# Patient Record
Sex: FEMALE | Race: WHITE
[De-identification: names, ages, dates, MRNs, and addresses within clinical notes are randomized per-mention and may not be internally consistent; named-entity substitution may affect disease eponyms.]

---

## 2017-02-07 ENCOUNTER — HOSPITAL ENCOUNTER (OUTPATIENT)
Dept: HOSPITAL 45 - C.MAMM | Age: 62
Discharge: HOME | End: 2017-02-07
Attending: OBSTETRICS & GYNECOLOGY
Payer: COMMERCIAL

## 2017-02-07 DIAGNOSIS — Z12.31: Primary | ICD-10-CM

## 2017-02-07 NOTE — MAMMOGRAPHY REPORT
BILATERAL DIGITAL SCREENING MAMMOGRAM WITH CAD: 2/7/2017

CLINICAL HISTORY: Routine screening.  Patient has no complaints.  





TECHNIQUE: Bilateral CC and MLO views were obtained.  Current study was also evaluated with a Comput
er Aided Detection (CAD) system.  



COMPARISON: Comparison is made to exams dated:  2/4/2016 mammogram, 1/29/2014 mammogram, 2/3/2015 ma
mmogram, 1/28/2013 mammogram, 1/23/2012 mammogram, and 1/18/2011 mammogram - [facility]
enter.   



BREAST COMPOSITION:  There are scattered areas of fibroglandular density in both breasts.  



FINDINGS:  No suspicious mass, architectural distortion or cluster of microcalcifications is seen.  



IMPRESSION:  ACR BI-RADS CATEGORY 1: NEGATIVE

There is no mammographic evidence of malignancy. A 1 year screening mammogram is recommended.  The p
atient will receive written notification of the results.  





Approximately 10% of breast cancers are not detected with mammography. A negative mammographic repor
t should not delay biopsy if a clinically suggestive mass is present.



Monisha Hill M.D.          

ay/:2/7/2017 15:12:12  



Imaging Technologist: Lisa Kaur RT(R)(M), [facility]

letter sent: Normal 1/2  

BI-RADS Code: ACR BI-RADS Category 1: Negative

## 2018-01-02 ENCOUNTER — HOSPITAL ENCOUNTER (OUTPATIENT)
Dept: HOSPITAL 45 - C.RAD1850 | Age: 63
Discharge: HOME | End: 2018-01-02
Attending: PHYSICIAN ASSISTANT
Payer: COMMERCIAL

## 2018-01-02 DIAGNOSIS — M51.36: ICD-10-CM

## 2018-01-02 DIAGNOSIS — M54.30: Primary | ICD-10-CM

## 2018-01-02 NOTE — DIAGNOSTIC IMAGING REPORT
L-SPINE MIN 4 VIEWS ROUTINE



CLINICAL HISTORY: SCIATICA    



COMPARISON: None



FINDINGS:  There is slight anterolisthesis of L4 and L5. Vertebral body heights

are maintained. There is no fracture or suspicious lesion. There is mild

multilevel disc space narrowing with moderate multilevel facet arthrosis.



IMPRESSION: 



1. No acute lumbar spine fracture.



2. Moderate multilevel facet arthrosis and mild multilevel degenerative disc

disease of the lumbar spine.







Electronically signed by:  Amor Lyn M.D.

1/2/2018 11:05 AM



Dictated Date/Time:  1/2/2018 11:04 AM

## 2018-02-09 ENCOUNTER — HOSPITAL ENCOUNTER (OUTPATIENT)
Dept: HOSPITAL 45 - C.MAMM | Age: 63
Discharge: HOME | End: 2018-02-09
Attending: OBSTETRICS & GYNECOLOGY
Payer: COMMERCIAL

## 2018-02-09 DIAGNOSIS — Z12.31: Primary | ICD-10-CM

## 2018-02-09 NOTE — MAMMOGRAPHY REPORT
BILATERAL DIGITAL SCREENING MAMMOGRAM TOMOSYNTHESIS WITH CAD: 2/9/2018



TECHNIQUE:  Breast tomosynthesis in addition to standard 2D mammography was performed. Current study 
was also evaluated with a Computer Aided Detection (CAD) system.  



COMPARISON: Comparison is made to exams dated:  2/7/2017 mammogram, 2/4/2016 mammogram, 2/3/2015 mamm
ogram, 1/29/2014 mammogram, 1/28/2013 mammogram, and 1/23/2012 mammogram - [facility]
er.   



BREAST COMPOSITION:  There are scattered areas of fibroglandular density in both breasts.  



FINDINGS:  No suspicious masses, calcifications, or areas of architectural distortion are noted in ei
ther breast. There has been no significant interval change compared to prior exams.  



IMPRESSION:  ACR BI-RADS CATEGORY 1: NEGATIVE

There is no mammographic evidence of malignancy. A 1 year screening mammogram is recommended.  The pa
tient will receive written notification of the results.  





Approximately 10% of breast cancers are not detected with mammography. A negative mammographic report
 should not delay biopsy if a clinically suggestive mass is present.



Shilpa Guillermo M.D.          

ah/:2/9/2018 12:04:05  



Imaging Technologist: Marnie STEVENS(R)(M), [facility]

letter sent: Normal 1/2  

BI-RADS Code: ACR BI-RADS Category 1: Negative

## 2018-02-12 ENCOUNTER — HOSPITAL ENCOUNTER (OUTPATIENT)
Dept: HOSPITAL 45 - C.ULTR | Age: 63
Discharge: HOME | End: 2018-02-12
Attending: PHYSICIAN ASSISTANT
Payer: COMMERCIAL

## 2018-02-12 DIAGNOSIS — R03.0: Primary | ICD-10-CM

## 2018-02-12 DIAGNOSIS — M54.2: ICD-10-CM

## 2018-02-12 DIAGNOSIS — I65.23: ICD-10-CM

## 2018-02-12 NOTE — DIAGNOSTIC IMAGING REPORT
CAROTID DOPPLER NECK ART



HISTORY:      R03.0 Elevated blood pressure reading without diagnosis of hyper



COMPARISON: None.



TECHNIQUE: Real-time, grayscale, and color Doppler sonography of the carotid

arteries was performed. Imaging reviewed in the transverse and longitudinal

planes. All measurements were calculated based on NASCET criteria.



FINDINGS:

Antegrade flow is seen in the bilateral vertebral arteries. 

The brachial pressures are hemodynamically similar.

Moderate plaque formation bilaterally



The peak systolic velocity within the right ICA is 71. The right systolic ratio

is 0.6.



The peak systolic velocity within the left ICA is 100. The left systolic ratio

is 0.9.



IMPRESSION:  

No hemodynamically significant stenosis seen within the carotid arteries.

Moderate plaque formation bilaterally









The above report was generated using voice recognition software.  It may contain

grammatical, syntax or spelling errors.







Electronically signed by:  Brenton Wadsworth M.D.

2/12/2018 1:56 PM



Dictated Date/Time:  2/12/2018 1:37 PM

## 2018-04-02 ENCOUNTER — HOSPITAL ENCOUNTER (OUTPATIENT)
Dept: HOSPITAL 45 - C.PAPS | Age: 63
Discharge: HOME | End: 2018-04-02
Attending: OBSTETRICS & GYNECOLOGY
Payer: COMMERCIAL

## 2018-04-02 DIAGNOSIS — Z12.4: Primary | ICD-10-CM
